# Patient Record
Sex: FEMALE | Race: WHITE | NOT HISPANIC OR LATINO | Employment: UNEMPLOYED | URBAN - METROPOLITAN AREA
[De-identification: names, ages, dates, MRNs, and addresses within clinical notes are randomized per-mention and may not be internally consistent; named-entity substitution may affect disease eponyms.]

---

## 2023-01-05 ENCOUNTER — TELEPHONE (OUTPATIENT)
Dept: OBSTETRICS AND GYNECOLOGY | Facility: CLINIC | Age: 33
End: 2023-01-05
Payer: MEDICAID

## 2023-01-05 NOTE — TELEPHONE ENCOUNTER
--Patient  needs a colpo, but  wants a female provider, so I informed her that [facility] don't do colpo. Pt is going to try to find her another provider. Pt is aware and voices understanding.              -- Message from Sarah Martin sent at 1/5/2023  9:34 AM CST -----  Contact: self  Type:  Sooner Apoointment Request    Caller is requesting a sooner appointment.  Caller declined first available appointment listed below.  Caller will not accept being placed on the waitlist and is requesting a message be sent to doctor.  Name of Caller:Reddy Connor    When is the first available appointment?07/2023  Symptoms:abnormal pap  Would the patient rather a call back or a response via [facility]? [facility] Call Back Number:[phone]    Additional Information: pt was referred through health dept/[facility]